# Patient Record
(demographics unavailable — no encounter records)

---

## 2025-04-09 NOTE — HISTORY OF PRESENT ILLNESS
[FreeTextEntry1] : Ms. 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 Patient feels generally well today. Denies SOB, chest pain, palpitations, dizziness, and syncope.  Telehealth visit today on [facility]. Telehealth was done using 2 way audiovisual. Patient was at home. Provider was in home office. Consent was provided by patient. Only patient and provider in the visit.     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 she didn't know when she was full, medication is now helping feels well overall exercising now ===================== 7/2024 Mounjaro 7.5 mg x 4 doses feels well, just reports a headache the first two days after she takes the injection The first two days post injection she is not hungry, is having a hard time eating enough - probable cause of headaches Loosing weight Feeling ruiz and eating smaller portions ================== 8/2024 Mounjaro 7.5 mg x 2months - Reports great appetite suppression - Loosing weight -Reporting more hair loss, starting Nutrafol - Will see endocrinologist - Is happy with this dose, will continue until hair loss improves ======================= 10/2024 Mounjaro 7.5 mg x 4 months - Feels well - Hair loss has improved, new hair growth - Nutrafol, saw dermatologist (iron and vitamin D deficiency, taking supplements) - Reports food noise is returning, getting cravings/hungrier ====================== 11/2024 Mounjaro 10 mg x 2 doses  - migraines and nausea for one to two days after injection - Not drinking enough water - Appetite suppressed, food noise is gone again ========================= 12/2024 Mounjaro 10 mg x 1 month - Reports no longer having side effects - No change in weight but reports inches are coming off - Reports food noise is also gone now, feels well - Continues to eat balanced meals. - Continues with vitamins. ========================== 2/2025 Mounjaro 10 mg - Reports food noise is still gone, appetite continues to be suppressed - food noise helping,  - Most recent weight of 247 lbs. - No longer with side effects ================================= 4/2025 Mounjaro 10mg -Experiences minor headache and nausea on date of injectionl -Experiencing cravings and less satiation with meals -Walks for exercise daily -Weight 145

## 2025-04-09 NOTE — DISCUSSION/SUMMARY
[FreeTextEntry1] : obesity/pre- diabetes  - Patient to continue start Mounjaro 12.5 mg once weekly for four weeks. All side effects reviewed. Questions and concerns addressed.  - A detailed discussion took place about the importance of CV risk reduction - The patient understands the importance of incorporating moderate aerobic exercise, 4 times/week for 40 minutes to reduce risk of CV disease. - A detailed discussion of lifestyle modification was done today. Patient understands the importance of a heart healthy diet and incorporating veggies/legumes/fruits/whole grains and limiting processed foods, sugars and carbs in their diet. - patient understands that stress reduction along with good sleep hygiene will help aid in weight loss - Follow up in 4 weeks  - The patient has a good understanding of the diagnosis, treatment plan and lifestyle modification. she will contact me at the office for any questions with their care or any changes in their health status.  Case discussed with Dr Arie Coe.

## 2025-05-14 NOTE — HISTORY OF PRESENT ILLNESS
[FreeTextEntry1] : Ms. 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 Patient feels generally well today. Denies SOB, chest pain, palpitations, dizziness, and syncope.  Telehealth visit today on [facility]. Telehealth was done using 2 way audiovisual. Patient was at home. Provider was in home office. Consent was provided by patient. Only patient and provider in the visit.     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 she didn't know when she was full, medication is now helping feels well overall exercising now ===================== 7/2024 Mounjaro 7.5 mg x 4 doses feels well, just reports a headache the first two days after she takes the injection The first two days post injection she is not hungry, is having a hard time eating enough - probable cause of headaches Loosing weight Feeling ruiz and eating smaller portions ================== 8/2024 Mounjaro 7.5 mg x 2months - Reports great appetite suppression - Loosing weight -Reporting more hair loss, starting Nutrafol - Will see endocrinologist - Is happy with this dose, will continue until hair loss improves ======================= 10/2024 Mounjaro 7.5 mg x 4 months - Feels well - Hair loss has improved, new hair growth - Nutrafol, saw dermatologist (iron and vitamin D deficiency, taking supplements) - Reports food noise is returning, getting cravings/hungrier ====================== 11/2024 Mounjaro 10 mg x 2 doses  - migraines and nausea for one to two days after injection - Not drinking enough water - Appetite suppressed, food noise is gone again ========================= 12/2024 Mounjaro 10 mg x 1 month - Reports no longer having side effects - No change in weight but reports inches are coming off - Reports food noise is also gone now, feels well - Continues to eat balanced meals. - Continues with vitamins. ========================== 2/2025 Mounjaro 10 mg - Reports food noise is still gone, appetite continues to be suppressed - food noise helping,  - Most recent weight of 247 lbs. - No longer with side effects ================================= 4/2025 Mounjaro 10mg -Experiences minor headache and nausea on date of injectionl -Experiencing cravings and less satiation with meals -Walks for exercise daily -Weight 145  =======================  5/14/2025 MED:  Currently on  Mounjaro 12.5 mg dose -Pt going to [employer] in [place] in June - Appetite suppression present. - Insomnia within first few days of taking it otherwise No side effects, feels well. -C/O tiredness has decreased protein intake - Feels they have plateaued in weight  -Pt reports exercise of  treadmill everyday, and resistance training  2-3 times a week  - Will new dose and reassess in one month

## 2025-05-14 NOTE — DISCUSSION/SUMMARY
[FreeTextEntry1] : obesity/pre- diabetes  - Patient to continue start Mounjaro 12.5 mg once weekly for four weeks. All side effects reviewed. Questions and concerns addressed.  - A detailed discussion took place about the importance of CV risk reduction - The patient understands the importance of incorporating moderate aerobic exercise, 4 times/week for 40 minutes to reduce risk of CV disease. - A detailed discussion of lifestyle modification was done today. Patient understands the importance of a heart healthy diet and incorporating veggies/legumes/fruits/whole grains and limiting processed foods, sugars and carbs in their diet. - patient understands that stress reduction along with good sleep hygiene will help aid in weight loss - Follow up in 8 weeks  - The patient has a good understanding of the diagnosis, treatment plan and lifestyle modification. she will contact me at the office for any questions with their care or any changes in their health status.  Case discussed with Dr Arie Coe.

## 2025-05-14 NOTE — HISTORY OF PRESENT ILLNESS
[FreeTextEntry1] : Ms. 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 Patient feels generally well today. Denies SOB, chest pain, palpitations, dizziness, and syncope.  Telehealth visit today on [facility]. Telehealth was done using 2 way audiovisual. Patient was at home. Provider was in home office. Consent was provided by patient. Only patient and provider in the visit.     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 she didn't know when she was full, medication is now helping feels well overall exercising now ===================== 7/2024 Mounjaro 7.5 mg x 4 doses feels well, just reports a headache the first two days after she takes the injection The first two days post injection she is not hungry, is having a hard time eating enough - probable cause of headaches Loosing weight Feeling ruiz and eating smaller portions ================== 8/2024 Mounjaro 7.5 mg x 2months - Reports great appetite suppression - Loosing weight -Reporting more hair loss, starting Nutrafol - Will see endocrinologist - Is happy with this dose, will continue until hair loss improves ======================= 10/2024 Mounjaro 7.5 mg x 4 months - Feels well - Hair loss has improved, new hair growth - Nutrafol, saw dermatologist (iron and vitamin D deficiency, taking supplements) - Reports food noise is returning, getting cravings/hungrier ====================== 11/2024 Mounjaro 10 mg x 2 doses  - migraines and nausea for one to two days after injection - Not drinking enough water - Appetite suppressed, food noise is gone again ========================= 12/2024 Mounjaro 10 mg x 1 month - Reports no longer having side effects - No change in weight but reports inches are coming off - Reports food noise is also gone now, feels well - Continues to eat balanced meals. - Continues with vitamins. ========================== 2/2025 Mounjaro 10 mg - Reports food noise is still gone, appetite continues to be suppressed - food noise helping,  - Most recent weight of 247 lbs. - No longer with side effects ================================= 4/2025 Mounjaro 10mg -Experiences minor headache and nausea on date of injectionl -Experiencing cravings and less satiation with meals -Walks for exercise daily -Weight 145  =======================  5/14/2025 MED:  Currently on  Mounjaro 12.5 mg dose -Pt going to [facility] in [place] in June - Appetite suppression present. - Insomnia within first few days of taking it otherwise No side effects, feels well. -C/O tiredness has decreased protein intake - Feels they have plateaued in weight  -Pt reports exercise of  treadmill everyday, and resistance training  2-3 times a week  - Will new dose and reassess in one month

## 2025-07-21 NOTE — REASON FOR VISIT
[FreeTextEntry1] : Telehealth visit today on [facility]. Telehealth was done using 2 way audiovisual. Patient was at home. Provider was in office. Both patient and provider in [facility]. Consent was provided by patient. Only patient and provider in the visit. Patient advised that TEB appointment is treated the same as how an in office appt would be treated with regard to operations and [facility] invoicing

## 2025-07-21 NOTE — DISCUSSION/SUMMARY
[FreeTextEntry1] : obesity/pre- diabetes  - Patient to continue Mounjaro 12.5 mg once weekly for eight weeks. All side effects reviewed. Questions and concerns addressed.  - A detailed discussion took place about the importance of CV risk reduction - The patient understands the importance of incorporating moderate aerobic exercise, 4 times/week for 40 minutes to reduce risk of CV disease. - A detailed discussion of lifestyle modification was done today. Patient understands the importance of a heart healthy diet and incorporating veggies/legumes/fruits/whole grains and limiting processed foods, sugars and carbs in their diet. - patient understands that stress reduction along with good sleep hygiene will help aid in weight loss - Follow up in 6-8 weeks  - The patient has a good understanding of the diagnosis, treatment plan and lifestyle modification. she will contact me at the office for any questions with their care or any changes in their health status.  Case discussed with Dr Arie Coe.

## 2025-07-21 NOTE — HISTORY OF PRESENT ILLNESS
[FreeTextEntry1] :   Ms. 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 Patient feels generally well today. Denies SOB, chest pain, palpitations, dizziness, and syncope.    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 she didn't know when she was full, medication is now helping feels well overall exercising now ===================== 7/2024 Mounjaro 7.5 mg x 4 doses -Feels well, just reports a headache the first two days after she takes the injection The first two days post injection she is not hungry, is having a hard time eating enough - probable cause of headaches Loosing weight Feeling ruiz and eating smaller portions ================== 8/2024 Mounjaro 7.5 mg x 2months - Reports great appetite suppression - Loosing weight -Reporting more hair loss, starting Nutrafol - Will see endocrinologist - Is happy with this dose, will continue until hair loss improves ======================= 10/2024 Mounjaro 7.5 mg x 4 months - Feels well - Hair loss has improved, new hair growth - Nutrafol, saw dermatologist (iron and vitamin D deficiency, taking supplements) - Reports food noise is returning, getting cravings/hungrier ====================== 11/2024 Mounjaro 10 mg x 2 doses  - migraines and nausea for one to two days after injection - Not drinking enough water - Appetite suppressed, food noise is gone again ========================= 12/2024 Mounjaro 10 mg x 1 month - Reports no longer having side effects - No change in weight but reports inches are coming off - Reports food noise is also gone now, feels well - Continues to eat balanced meals. - Continues with vitamins. ========================== 2/2025 Mounjaro 10 mg - Reports food noise is still gone, appetite continues to be suppressed - food noise helping,  - Most recent weight of 147 lbs. - No longer with side effects ========================= 7/2025 Mounjaro 12.5 mg  - Feels well, - Reports still experiencing hair loss, continues with vitamins and nutrafol. Also consulted with a dermatologist. Does report low iron, to start iron supplements.  - Reports weight of 144-145 lbs, happy with current weight - Will continue on current dose given results.

## 2025-07-21 NOTE — REASON FOR VISIT
[FreeTextEntry1] : Telehealth visit today on [facility]. Telehealth was done using 2 way audiovisual. Patient was at home. Provider was in office. Both patient and provider in [facility]. Consent was provided by patient. Only patient and provider in the visit. Patient advised that TEB appointment is treated the same as how an in office appt would be treated with regard to operations and [facility] invoicing      #1:

## 2025-07-21 NOTE — DISCUSSION/SUMMARY
Obesity is improving with lifestyle modifications.  Discussed the patient's BMI.  The BMI is above average; BMI management plan is completed.  General weight loss/lifestyle modification strategies discussed (elicit support from others; identify saboteurs; non-food rewards, etc).  Informal exercise measures discussed, e.g. taking stairs instead of elevator.   [FreeTextEntry1] : obesity/pre- diabetes  - Patient to continue Mounjaro 12.5 mg once weekly for eight weeks. All side effects reviewed. Questions and concerns addressed.  - A detailed discussion took place about the importance of CV risk reduction - The patient understands the importance of incorporating moderate aerobic exercise, 4 times/week for 40 minutes to reduce risk of CV disease. - A detailed discussion of lifestyle modification was done today. Patient understands the importance of a heart healthy diet and incorporating veggies/legumes/fruits/whole grains and limiting processed foods, sugars and carbs in their diet. - patient understands that stress reduction along with good sleep hygiene will help aid in weight loss - Follow up in 6-8 weeks  - The patient has a good understanding of the diagnosis, treatment plan and lifestyle modification. she will contact me at the office for any questions with their care or any changes in their health status.  Case discussed with Dr Arie Coe.